# Patient Record
Sex: MALE | Race: BLACK OR AFRICAN AMERICAN | NOT HISPANIC OR LATINO | URBAN - METROPOLITAN AREA
[De-identification: names, ages, dates, MRNs, and addresses within clinical notes are randomized per-mention and may not be internally consistent; named-entity substitution may affect disease eponyms.]

---

## 2020-09-18 ENCOUNTER — OUTPATIENT (OUTPATIENT)
Dept: OUTPATIENT SERVICES | Facility: HOSPITAL | Age: 35
LOS: 1 days | Discharge: HOME | End: 2020-09-18

## 2020-09-21 DIAGNOSIS — F39 UNSPECIFIED MOOD [AFFECTIVE] DISORDER: ICD-10-CM

## 2020-09-21 DIAGNOSIS — F12.10 CANNABIS ABUSE, UNCOMPLICATED: ICD-10-CM

## 2020-09-21 DIAGNOSIS — F10.10 ALCOHOL ABUSE, UNCOMPLICATED: ICD-10-CM

## 2021-08-11 PROBLEM — Z00.00 ENCOUNTER FOR PREVENTIVE HEALTH EXAMINATION: Status: ACTIVE | Noted: 2021-08-11

## 2021-08-16 DIAGNOSIS — M79.641 PAIN IN RIGHT HAND: ICD-10-CM

## 2021-08-17 ENCOUNTER — APPOINTMENT (OUTPATIENT)
Dept: ORTHOPEDIC SURGERY | Facility: CLINIC | Age: 36
End: 2021-08-17
Payer: MEDICAID

## 2021-08-17 VITALS — WEIGHT: 165 LBS | HEIGHT: 76 IN | BODY MASS INDEX: 20.09 KG/M2

## 2021-08-17 DIAGNOSIS — S62.336A DISPLACED FRACTURE OF NECK OF FIFTH METACARPAL BONE, RIGHT HAND, INITIAL ENCOUNTER FOR CLOSED FRACTURE: ICD-10-CM

## 2021-08-17 PROCEDURE — 99203 OFFICE O/P NEW LOW 30 MIN: CPT

## 2021-08-17 PROCEDURE — 73130 X-RAY EXAM OF HAND: CPT | Mod: RT

## 2021-08-17 NOTE — PHYSICAL EXAM
[FreeTextEntry1] : General: well nourished, in no acute distress, alert and oriented to person, place and time.\par Psychiatric: normal mood and affect, no abnormal movements or speech patterns.\par Eyes: vision intact without deficits, sclera and conjunctiva were normal, pupils were equal in size. \par ENT: Ears and nose were normal in appearance. No thyromegaly.\par Lymph: no enlarged nodes, no lymphedema in extremity.\par Respiratory: Normal respiratory rhythm and effort. No wheezing detected without auscultation. No shortness of breath or respiratory distress.\par Cardiac: no cardiac related leg swelling.\par Neurology: normal gross sensation in extremities to light touch.\par Abdomen: soft, non-tender, tympanic, no masses.\par \par RUE:\par \par Skin CDI. No swelling or ecchymosis.\par M/U/R/AIN/PIN 5/5. M/U/R/Ax SILT. +2 Rad pulse. Compartments soft. \par No palpable masses or lymphedema.\par Wrist ROM: painless active flexion and extension\par No TTP along 5th MC, no palpable anatomic deformities. able to make a full clenched fist w/o pain.

## 2021-08-17 NOTE — DISCUSSION/SUMMARY
[de-identified] : This is a 36-year-old male with a healed right fifth metacarpal fracture, currently asymptomatic.  I have therefore recommended continued conservative management with full range of motion, avoiding heavy lifting or any activities that cause pain.  Follow-up in 6 weeks for repeat x-rays.

## 2021-08-17 NOTE — DATA REVIEWED
[de-identified] : 8/17/2021–right hand x-rays (AP, lateral, oblique): There is a displaced fifth metacarpal neck fracture, impacted with acceptable alignment, with surrounding callus formation.

## 2021-08-17 NOTE — HISTORY OF PRESENT ILLNESS
[FreeTextEntry1] : This is a 36M, ambidextrous, current smoker (5+ pack years), who is presenting for evaluation of right hand pain which began on 7/20/2021 after falling on his right hand while drunk.  Patient was seen at [facility], [place] where x-rays confirmed a right fifth metacarpal fracture.  He was placed in a splint and sent home.  He then took this splint off 2 weeks later on his own.  He has since been doing very well and says he has no pain and has regained nearly full range of motion.  He is not taking any pain medication.